# Patient Record
Sex: FEMALE | Race: BLACK OR AFRICAN AMERICAN | Employment: OTHER | URBAN - METROPOLITAN AREA
[De-identification: names, ages, dates, MRNs, and addresses within clinical notes are randomized per-mention and may not be internally consistent; named-entity substitution may affect disease eponyms.]

---

## 2017-05-22 ENCOUNTER — OFFICE VISIT (OUTPATIENT)
Dept: CARDIOLOGY CLINIC | Age: 52
End: 2017-05-22

## 2017-05-22 VITALS
BODY MASS INDEX: 45.12 KG/M2 | HEIGHT: 61 IN | DIASTOLIC BLOOD PRESSURE: 53 MMHG | SYSTOLIC BLOOD PRESSURE: 92 MMHG | WEIGHT: 239 LBS | HEART RATE: 72 BPM

## 2017-05-22 DIAGNOSIS — I26.99 OTHER PULMONARY EMBOLISM WITHOUT ACUTE COR PULMONALE, UNSPECIFIED CHRONICITY (HCC): ICD-10-CM

## 2017-05-22 DIAGNOSIS — Z95.810 AUTOMATIC IMPLANTABLE CARDIOVERTER-DEFIBRILLATOR IN SITU: ICD-10-CM

## 2017-05-22 DIAGNOSIS — I42.9 CARDIOMYOPATHY, UNSPECIFIED TYPE (HCC): ICD-10-CM

## 2017-05-22 DIAGNOSIS — I50.22 SYSTOLIC CHF, CHRONIC (HCC): Primary | ICD-10-CM

## 2017-05-22 NOTE — PROGRESS NOTES
HISTORY OF PRESENT ILLNESS  Korey Sahu is a 46 y.o. female. HPI Comments: Patient with cmp,chf,icd. On follow up patient denies any chest pains, palpitation or other significant symptoms. She has sob on exertion feels worse at times        CHF   The history is provided by the patient. This is a chronic problem. The problem occurs constantly. The problem has not changed since onset. Associated symptoms include shortness of breath. Pertinent negatives include no chest pain. Cardiomyopathy   The history is provided by the patient. This is a chronic problem. The problem occurs constantly. The problem has not changed since onset. Associated symptoms include shortness of breath. Pertinent negatives include no chest pain. Nothing aggravates the symptoms. Review of Systems   Constitutional: Negative for chills and fever. HENT: Negative for nosebleeds. Eyes: Negative for blurred vision and double vision. Respiratory: Positive for shortness of breath. Negative for cough, hemoptysis, sputum production and wheezing. Cardiovascular: Negative for chest pain, palpitations, orthopnea, claudication, leg swelling and PND. Gastrointestinal: Negative for heartburn, nausea and vomiting. Musculoskeletal: Negative for myalgias. Skin: Negative for rash. Neurological: Negative for dizziness and weakness. Endo/Heme/Allergies: Does not bruise/bleed easily.      Family History   Problem Relation Age of Onset    Heart Disease Mother     Heart Disease Father        Past Medical History:   Diagnosis Date    Automatic implantable cardiac defibrillator in situ     BIV ICD 5/10 PLAN    Chronic systolic heart failure (HCC)     SEVERE CMP WITH EF 15%    Diabetes ([address]     DVT (deep venous thrombosis) ([address] 4/2/2015    has ivc filter     Hypercholesterolemia     Non-ischemic cardiomyopathy ([address]     Pulmonary emboli ([address] 3/6/2014    ON XARELTO STABLE     Sleep apnea     uses CPAP       Past Surgical History: Procedure Laterality Date    COLONOSCOPY N/A 6/9/2016    COLONOSCOPY, SCREENING w/polypectomy performed by William David MD at [address] [place] [address] HX APPENDECTOMY      HX GASTRIC BYPASS      did not state     HX GYN      hysterectomy    HX OTHER SURGICAL      ivc filter    HX PACEMAKER  2010    PACEMAKER/DEFIBULATOR: XT CRT-D DEFIBRILATOR IMPLANTED. SEE CARD FOR SERIAL NUMBERS AND MODEL #S AND DATES IMPLANTED       Social History   Substance Use Topics    Smoking status: Former Smoker    Smokeless tobacco: Former User     Quit date: 6/8/1985    Alcohol use No       Allergies   Allergen Reactions    Latex Rash    Ace Inhibitors Swelling    Lisinopril Swelling    Tape [Adhesive] Rash       Current Outpatient Prescriptions   Medication Sig    cetirizine (ZYRTEC) 10 mg tablet Take  by mouth.  benzonatate (TESSALON) 100 mg capsule Take 100 mg by mouth three (3) times daily as needed for Cough.  albuterol (PROVENTIL HFA, VENTOLIN HFA, PROAIR HFA) 90 mcg/actuation inhaler Take 2 Puffs by inhalation as needed for Wheezing.  ergocalciferol (ERGOCALCIFEROL) 50,000 unit capsule Take 50,000 Units by mouth every seven (7) days.  buPROPion SR (WELLBUTRIN SR) 150 mg SR tablet Take 150 mg by mouth daily.  allopurinol (ZYLOPRIM) 100 mg tablet Take  by mouth as needed.  triamcinolone acetonide (KENALOG) 0.1 % topical cream Apply  to affected area two (2) times a day. use thin layer    aspirin delayed-release 81 mg tablet Take  by mouth daily.  sitaGLIPtin (JANUVIA) 50 mg tablet Take 50 mg by mouth daily.  Alpha Lipoic Acid 600 mg cap Take 200 mg by mouth daily.  carvedilol (COREG) 12.5 mg tablet Take  by mouth. 1/2 morning, 1 tablet night    digoxin (LANOXIN) 0.125 mg tablet Take  by mouth daily.  b complex vitamins (B COMPLEX 1) tablet Take 1 Tab by mouth daily.  multivitamin (ONE A DAY) tablet Take 1 Tab by mouth daily.     ferrous sulfate 325 mg (65 mg iron) tablet Take  by mouth Daily (before breakfast).  simvastatin (ZOCOR) 5 mg tablet Take  by mouth nightly.  furosemide (LASIX) 40 mg tablet Take  by mouth daily. Pt alternates 40 mg one day, then 80 mg next day    fluticasone (FLONASE) 50 mcg/actuation nasal spray as needed.  spironolactone (ALDACTONE) 25 mg tablet Take  by mouth daily.  NYSTATIN/TRIAMCIN (MYCOLOG II EX) by Apply Externally route.  calcium citrate-vitamin d3 (CITRACAL+D) 315-200 mg-unit Tab Take 2 Tabs by mouth daily (with breakfast). Calcium citrate po 1000 mg by mouth twice daily    lactic acid (LACTINOL) 10 % lotion Apply 12 % to affected area as needed. No current facility-administered medications for this visit. Visit Vitals    BP 92/53    Pulse 72    Ht 5' 1\" (1.549 m)    Wt 108.4 kg (239 lb)    BMI 45.16 kg/m2         Physical Exam   Constitutional: She is oriented to person, place, and time. She appears well-developed and well-nourished. HENT:   Head: Normocephalic and atraumatic. Eyes: Conjunctivae are normal.   Neck: Neck supple. No JVD present. No tracheal deviation present. No thyromegaly present. Cardiovascular: Normal rate and regular rhythm. PMI is not displaced. Exam reveals no gallop and no decreased pulses. No murmur heard. Early systolic murmur is present  at the upper right sternal border  Pulmonary/Chest: No respiratory distress. She has no wheezes. She has no rales. She exhibits no tenderness. Abdominal: Soft. There is no tenderness. Musculoskeletal: She exhibits no edema. Neurological: She is alert and oriented to person, place, and time. Skin: Skin is warm. Psychiatric: She has a normal mood and affect. Ms. Anny Montgomery has a reminder for a \"due or due soon\" health maintenance. I have asked that she contact her primary care provider for follow-up on this health maintenance.     CARDIOLOGY STUDIES 1/22/2013 12/1/2011 1/1/2011 12/1/2010 1/1/2010   Cardiac Cath Result - - - - EF 15%, mild MR;NL Coronary   Echocardiogram - Complete Result ef 15-20%,dil lv.mild mr,tr Enlarged LV with 15% LVEF EF 25% - -   PFT's with DLCO Result - - - See report -   ECHO:12/2013  MODERATE LEFT VENTRICULAR DILATATION. DECREASED LEFT VENTRICULAR EJECTION FRACTION. GLOBAL HYPOKINESIS. AKINESIS OF THE ANTEROSEPTAL AND APICAL SEGMENTS. EJECTION FRACTION OF 10-15 %. IMPAIRED RELAXATION FILLING PATTERN FOR AGE. MILD LEFT ATRIAL DILATATION. ESTIMATED PULMONARY ARTERY PRESSURE IS 26 MMHG. SUMMARY:echo:4/2015  Left ventricle: The ventricle was severely dilated. Systolic function was  severely reduced. Ejection fraction was estimated to be 10 %. There was  severe diffuse hypokinesis. Doppler parameters were consistent with  restrictive physiology, indicative of decreased left ventricular diastolic  compliance and/or increased left atrial pressure. COMPARISONS:  Comparison was made with the previous study of 13-Apr-2015. LV size  (LVEDD) has increased from 65 mm to 71 mm. LV thickness has not changed. LV overall function has not changed. Mitral regurgitation has worsened. Pulmonary artery pressure has not changed. I Have personally reviewed recent relevant labs available and discussed with patient  4/2016-cbc,bmp,lipids  Assessment       ICD-10-CM ICD-9-CM    1. Systolic CHF, chronic (HCC) I50.22 428.22      428.0     stable   2. Cardiomyopathy, unspecified type I42.9 425.4     stable   3. Automatic implantable cardioverter-defibrillator in situ Z95.810 V45.02     normal function   4.  Other pulmonary embolism without acute cor pulmonale, unspecified chronicity (HCC) I26.99 415.19     completed treatment   Discussed regarding cardiac transplant-followed  in transplant clinic  Unable to tolerate hydralazine/isordil due to low bp    Medications Discontinued During This Encounter   Medication Reason    hydrALAZINE (APRESOLINE) 25 mg tablet Discontinued by Another Clinician    isosorbide dinitrate (ISORDIL) 5 mg tablet Discontinued by Another Clinician       No orders of the defined types were placed in this encounter. Follow-up Disposition:  Return in about 6 months (around 11/22/2017).

## 2017-05-22 NOTE — MR AVS SNAPSHOT
Visit Information Date & Time Provider Department Dept. Phone Encounter #  
 5/22/2017 12:30 PM Patti Sesay MD [facility] [id number] Follow-up Instructions Return in about 6 months (around 11/22/2017). Your Appointments 5/22/2017 12:30 PM  
Follow Up with Patti Sesay MD  
[facility] ([facility]) Appt Note: 6 month follow up; 6 month follow up  
 [address] [facility] [address]  
  
   
 [address] [id number] [address] [address] 7/19/2017  8:00 AM  
[facility] ([facility]) Appt Note: Carelink Transmission/Newell/Wireless [address] [facility] [address]  
  
   
 [address] [facility] [address]  
  
    
 10/27/2017 11:00 AM  
PROCEDURE with Patti Sesay MD  
[facility] ([facility]) Appt Note: 1 year in office pacer check [address] [facility] [address]  
  
   
 [address] [id number] [address] [address] Upcoming Health Maintenance Date Due Hepatitis C Screening 1965 Pneumococcal 19-64 Medium Risk (1 of 1 - PPSV23) 1/19/1984 DTaP/Tdap/Td series (1 - Tdap) 1/19/1986 PAP AKA CERVICAL CYTOLOGY 1/19/1986 FOBT Q 1 YEAR AGE 50-75 1/19/2015 INFLUENZA AGE 9 TO ADULT 8/1/2017 BREAST CANCER SCRN MAMMOGRAM 8/15/2018 Allergies as of 5/22/2017  Review Complete On: 5/22/2017 By: Patti Sesay MD  
  
 Severity Noted Reaction Type Reactions Latex    Rash Ace Inhibitors    Swelling Lisinopril    Swelling Tape [Adhesive]  07/20/2015    Rash Current Immunizations  Never Reviewed No immunizations on file. Not reviewed this visit You Were Diagnosed With   
  
 Codes Comments Systolic CHF, chronic (HCC)    -  Primary ICD-10-CM: K23.16 ICD-9-CM: 428.22, 428.0 stable Cardiomyopathy, unspecified type     ICD-10-CM: I42.9 ICD-9-CM: 425.4 stable Automatic implantable cardioverter-defibrillator in situ     ICD-10-CM: Z95.810 ICD-9-CM: V45.02 normal function Other pulmonary embolism without acute cor pulmonale, unspecified chronicity (HCC)     ICD-10-CM: I26.99 
ICD-9-CM: 415.19 completed treatment Vitals BP Pulse Height(growth percentile) Weight(growth percentile) BMI OB Status 92/53 72 5' 1\" (1.549 m) 239 lb (108.4 kg) 45.16 kg/m2 Hysterectomy Smoking Status Former Smoker Vitals History BMI and BSA Data Body Mass Index Body Surface Area  
 45.16 kg/m 2 2.16 m 2 Preferred Pharmacy Pharmacy Name Phone [facility] [address], [address] [facility] Your Updated Medication List  
  
   
This list is accurate as of: 5/22/17 12:22 PM.  Always use your most recent med list.  
  
  
  
  
 albuterol 90 mcg/actuation inhaler Commonly known as:  PROVENTIL HFA, VENTOLIN HFA, PROAIR HFA Take 2 Puffs by inhalation as needed for Wheezing. allopurinol 100 mg tablet Commonly known as:  Joellyn Zack Take  by mouth as needed. Alpha Lipoic Acid 600 mg Cap Take 200 mg by mouth daily. aspirin delayed-release 81 mg tablet Take  by mouth daily. B COMPLEX 1 tablet Generic drug:  b complex vitamins Take 1 Tab by mouth daily. benzonatate 100 mg capsule Commonly known as:  TESSALON Take 100 mg by mouth three (3) times daily as needed for Cough. buPROPion  mg SR tablet Commonly known as:  Amada Puller Take 150 mg by mouth daily. calcium citrate-vitamin d3 315-200 mg-unit Tab Commonly known as:  CITRACAL+D Take 2 Tabs by mouth daily (with breakfast). Calcium citrate po 1000 mg by mouth twice daily  
  
 cetirizine 10 mg tablet Commonly known as:  ZYRTEC Take  by mouth. COREG 12.5 mg tablet Generic drug:  carvedilol Take  by mouth. 1/2 morning, 1 tablet night  
  
 digoxin 0.125 mg tablet Commonly known as:  LANOXIN Take  by mouth daily. ergocalciferol 50,000 unit capsule Commonly known as:  ERGOCALCIFEROL Take 50,000 Units by mouth every seven (7) days. ferrous sulfate 325 mg (65 mg iron) tablet Take  by mouth Daily (before breakfast). FLONASE 50 mcg/actuation nasal spray Generic drug:  fluticasone  
as needed. JANUVIA 50 mg tablet Generic drug:  SITagliptin Take 50 mg by mouth daily. lactic acid 10 % lotion Commonly known as:  Miguelito Fanti Apply 12 % to affected area as needed. LASIX 40 mg tablet Generic drug:  furosemide Take  by mouth daily. Pt alternates 40 mg one day, then 80 mg next day  
  
 multivitamin tablet Commonly known as:  ONE A DAY Take 1 Tab by mouth daily. MYCOLOG II EX  
by Apply Externally route. simvastatin 5 mg tablet Commonly known as:  ZOCOR Take  by mouth nightly. spironolactone 25 mg tablet Commonly known as:  ALDACTONE Take  by mouth daily. triamcinolone acetonide 0.1 % topical cream  
Commonly known as:  KENALOG Apply  to affected area two (2) times a day. use thin layer Follow-up Instructions Return in about 6 months (around 11/22/2017). Introducing \[facility]\"" & HEALTH SERVICES! Dear Shayna Johns: Thank you for requesting a Gamelet account. Our records indicate that you already have an active Gamelet account. You can access your account anytime at [email or web address] Did you know that you can access your hospital and ER discharge instructions at any time in Gamelet? You can also review all of your test results from your hospital stay or ER visit. Additional Information If you have questions, please visit the Frequently Asked Questions section of the Gamelet website at [email or web address] Remember, LeadCloudhart is NOT to be used for urgent needs. For medical emergencies, dial 911. Now available from your iPhone and Android! Please provide this summary of care documentation to your next provider. Your primary care clinician is listed as Franky Sutherland. If you have any questions after today's visit, please call [phone].

## 2017-05-22 NOTE — PROGRESS NOTES
1. Have you been to the ER, urgent care clinic since your last visit? Hospitalized since your last visit? Yes Where: [facility]    2. Have you seen or consulted any other health care providers outside of the [address] [place] [address] since your last visit? Include any pap smears or colon screening. Yes Where: Dr Elliot Gonzalez     3. Since your last visit, have you had any of the following symptoms? .           4. Have you had any blood work, X-rays or cardiac testing? Yes Where: [facility]             5.  Where do you normally have your labs drawn? [facility]    6. Do you need any refills today?    No

## 2017-05-22 NOTE — LETTER
Teresa Briseno 1965 5/22/2017 Dear MD Viviane Merida MD Verlie Sack, MD Nicklas Rack, MD 
 
I had the pleasure of evaluating  Ms. Steele in office today. Below are the relevant portions of my assessment and plan of care. ICD-10-CM ICD-9-CM 1. Systolic CHF, chronic (HCC) I50.22 428.22   
  428.0   
 stable 2. Cardiomyopathy, unspecified type I42.9 425.4   
 stable 3. Automatic implantable cardioverter-defibrillator in situ Z95.810 V45.02   
 normal function 4. Other pulmonary embolism without acute cor pulmonale, unspecified chronicity (HCC) I26.99 415.19   
 completed treatment Current Outpatient Prescriptions Medication Sig Dispense Refill  cetirizine (ZYRTEC) 10 mg tablet Take  by mouth.  benzonatate (TESSALON) 100 mg capsule Take 100 mg by mouth three (3) times daily as needed for Cough.  albuterol (PROVENTIL HFA, VENTOLIN HFA, PROAIR HFA) 90 mcg/actuation inhaler Take 2 Puffs by inhalation as needed for Wheezing.  ergocalciferol (ERGOCALCIFEROL) 50,000 unit capsule Take 50,000 Units by mouth every seven (7) days.  buPROPion SR (WELLBUTRIN SR) 150 mg SR tablet Take 150 mg by mouth daily.  allopurinol (ZYLOPRIM) 100 mg tablet Take  by mouth as needed.  triamcinolone acetonide (KENALOG) 0.1 % topical cream Apply  to affected area two (2) times a day. use thin layer  aspirin delayed-release 81 mg tablet Take  by mouth daily.  sitaGLIPtin (JANUVIA) 50 mg tablet Take 50 mg by mouth daily.  Alpha Lipoic Acid 600 mg cap Take 200 mg by mouth daily.  carvedilol (COREG) 12.5 mg tablet Take  by mouth. 1/2 morning, 1 tablet night  digoxin (LANOXIN) 0.125 mg tablet Take  by mouth daily.  b complex vitamins (B COMPLEX 1) tablet Take 1 Tab by mouth daily.  multivitamin (ONE A DAY) tablet Take 1 Tab by mouth daily.     
 ferrous sulfate 325 mg (65 mg iron) tablet Take  by mouth Daily (before breakfast).  simvastatin (ZOCOR) 5 mg tablet Take  by mouth nightly.  furosemide (LASIX) 40 mg tablet Take  by mouth daily. Pt alternates 40 mg one day, then 80 mg next day  fluticasone (FLONASE) 50 mcg/actuation nasal spray as needed.  spironolactone (ALDACTONE) 25 mg tablet Take  by mouth daily.  NYSTATIN/TRIAMCIN (MYCOLOG II EX) by Apply Externally route.  calcium citrate-vitamin d3 (CITRACAL+D) 315-200 mg-unit Tab Take 2 Tabs by mouth daily (with breakfast). Calcium citrate po 1000 mg by mouth twice daily  lactic acid (LACTINOL) 10 % lotion Apply 12 % to affected area as needed. No orders of the defined types were placed in this encounter. If you have questions, please do not hesitate to call me. I look forward to following Ms. Steele along with you. Sincerely, Xochilt Chinchilla MD

## 2017-10-27 ENCOUNTER — CLINICAL SUPPORT (OUTPATIENT)
Dept: CARDIOLOGY CLINIC | Age: 52
End: 2017-10-27

## 2017-10-27 VITALS
HEIGHT: 61 IN | WEIGHT: 237 LBS | SYSTOLIC BLOOD PRESSURE: 106 MMHG | DIASTOLIC BLOOD PRESSURE: 55 MMHG | BODY MASS INDEX: 44.75 KG/M2 | HEART RATE: 68 BPM

## 2017-10-27 DIAGNOSIS — Z95.810 AUTOMATIC IMPLANTABLE CARDIOVERTER-DEFIBRILLATOR IN SITU: ICD-10-CM

## 2017-10-27 DIAGNOSIS — I26.99 OTHER PULMONARY EMBOLISM WITHOUT ACUTE COR PULMONALE, UNSPECIFIED CHRONICITY (HCC): ICD-10-CM

## 2017-10-27 DIAGNOSIS — I47.20 V TACH: ICD-10-CM

## 2017-10-27 DIAGNOSIS — I50.23 ACUTE ON CHRONIC SYSTOLIC HEART FAILURE (HCC): Primary | ICD-10-CM

## 2017-10-27 DIAGNOSIS — I42.9 CARDIOMYOPATHY, UNSPECIFIED TYPE (HCC): ICD-10-CM

## 2017-10-27 RX ORDER — FUROSEMIDE 40 MG/1
80 TABLET ORAL DAILY
Qty: 120 TAB | Refills: 6 | Status: SHIPPED | OUTPATIENT
Start: 2017-10-27 | End: 2017-12-05

## 2017-10-27 NOTE — PROGRESS NOTES
1. Have you been to the ER, urgent care clinic since your last visit? Hospitalized since your last visit? No    2. Have you seen or consulted any other health care providers outside of the [address] [place] [address] since your last visit? Include any pap smears or colon screening. Yes Where: Nephrology     3. Since your last visit, have you had any of the following symptoms? shortness of breath. 4.  Have you had any blood work, X-rays or cardiac testing? Yes Where: Dr Candi Weiner for visit: labs              5.  Where do you normally have your labs drawn? [facility]    6. Do you need any refills today?    No

## 2017-10-27 NOTE — LETTER
Maria Guadalupe Hyde 1965 
 
10/27/2017 Dear MD Jefry Zamudio MD Bevely Case, MD Fletcher Koller, MD 
 
I had the pleasure of evaluating  Ms. Steele in office today. Below are the relevant portions of my assessment and plan of care. ICD-10-CM ICD-9-CM 1. Acute on chronic systolic heart failure (HCC) I50.23 428.23 2D ECHO COMPLETE ADULT (TTE) IMPLANTABLE CARDIOVASULAR KARL SYST  
   SINGLE,DUAL,OR MULTIPLE LEAD IMPLANT CARD DEFIB SYST  
 increase sob and fluid overload 
lasix increased 2. Cardiomyopathy, unspecified type ([facility] [address] I42.9 425.4   
 stable 3. Automatic implantable cardioverter-defibrillator in situ Z95.810 V45.02 IMPLANTABLE CARDIOVASULAR KARL SYST  
   SINGLE,DUAL,OR MULTIPLE LEAD IMPLANT CARD DEFIB SYST  
 normal function 4. Other pulmonary embolism without acute cor pulmonale, unspecified chronicity (HCC) I26.99 415.19   
 completed treatment 5. V tach ([facility] [address] I47.2 427.1   
 stable 
noted on icd in past  
 
 
Current Outpatient Prescriptions Medication Sig Dispense Refill  furosemide (LASIX) 40 mg tablet Take 2 Tabs by mouth daily. 120 Tab 6  cetirizine (ZYRTEC) 10 mg tablet Take  by mouth.  benzonatate (TESSALON) 100 mg capsule Take 100 mg by mouth three (3) times daily as needed for Cough.  albuterol (PROVENTIL HFA, VENTOLIN HFA, PROAIR HFA) 90 mcg/actuation inhaler Take 2 Puffs by inhalation as needed for Wheezing.  ergocalciferol (ERGOCALCIFEROL) 50,000 unit capsule Take 50,000 Units by mouth every seven (7) days.  buPROPion SR (WELLBUTRIN SR) 150 mg SR tablet Take 150 mg by mouth daily.  allopurinol (ZYLOPRIM) 100 mg tablet Take  by mouth as needed.  triamcinolone acetonide (KENALOG) 0.1 % topical cream Apply  to affected area two (2) times a day. use thin layer  aspirin delayed-release 81 mg tablet Take  by mouth daily.  sitaGLIPtin (JANUVIA) 50 mg tablet Take 50 mg by mouth daily.  Alpha Lipoic Acid 600 mg cap Take 200 mg by mouth daily.  carvedilol (COREG) 12.5 mg tablet Take  by mouth. 1/2 morning, 1 tablet night  digoxin (LANOXIN) 0.125 mg tablet Take  by mouth daily.  b complex vitamins (B COMPLEX 1) tablet Take 1 Tab by mouth daily.  multivitamin (ONE A DAY) tablet Take 1 Tab by mouth daily.  ferrous sulfate 325 mg (65 mg iron) tablet Take  by mouth Daily (before breakfast).  simvastatin (ZOCOR) 5 mg tablet Take  by mouth nightly.  fluticasone (FLONASE) 50 mcg/actuation nasal spray as needed.  NYSTATIN/TRIAMCIN (MYCOLOG II EX) by Apply Externally route.  calcium citrate-vitamin d3 (CITRACAL+D) 315-200 mg-unit Tab Take 2 Tabs by mouth daily (with breakfast). Calcium citrate po 1000 mg by mouth twice daily  lactic acid (LACTINOL) 10 % lotion Apply 12 % to affected area as needed. Orders Placed This Encounter [id number]  2D ECHO COMPLETE ADULT (TTE) Standing Status:   Future Standing Expiration Date:   4/25/2018 Order Specific Question:   Reason for Exam: Answer:   see diagnosis  SINGLE,DUAL,OR MULTIPLE LEAD IMPLANT CARD DEFIB SYST Order Specific Question:   Reason for Exam: Answer:   icd  furosemide (LASIX) 40 mg tablet Sig: Take 2 Tabs by mouth daily. Dispense:  120 Tab Refill:  6 If you have questions, please do not hesitate to call me. I look forward to following Ms. Steele along with you. Sincerely, Agustin lOivas MD

## 2017-10-27 NOTE — MR AVS SNAPSHOT
Visit Information Date & Time Provider Department Dept. Phone Encounter #  
 10/27/2017 11:00 AM Jordana Robb MD [facility] [phone] [id number] Follow-up Instructions Return in about 2 months (around 12/27/2017). Your Appointments 11/30/2017  1:30 PM  
PROCEDURE with [place] ECHO [facility] ([facility]) Appt Note: echo/granda [address] [facility] [address]  
  
   
 [address] [id number] [address] [address]  
  
    
 12/5/2017 10:15 AM  
Follow Up with Jordana Robb MD  
[facility] ([facility]) Appt Note: 2 month [address] [facility] [address]  
  
   
 [address] [id number] [address] [address]  
  
    
 1/31/2018  9:00 AM  
CARELINK with [facility] PACER [facility] ([facility]) Appt Note: Carelink Transmission/Granda/Wireless [address] [facility] [address]  
  
   
 [address] [facility] [address]  
  
    
 5/2/2018  8:15 AM  
CARELINK with [facility] PACER [facility] ([facility]) Appt Note: Carelink Transmission/Granda/Wireless [address] [facility] [address]  
[phone]  
  
    
 8/8/2018  8:15 AM  
CARELINK with [facility] PACER [facility] ([facility]) Appt Note: Carelink Transmission/Granda/Wireless [address] [facility] [address]  
[phone]  
  
    
 12/7/2018 10:30 AM  
PROCEDURE with Jordana Robb MD  
[facility] ([facility]) Appt Note: 1 year in office pacer check/Medtronic [address] [facility] [address]  
  
   
 [address] [address] [place] [address] Upcoming Health Maintenance Date Due Hepatitis C Screening 1965 Pneumococcal 19-64 Medium Risk (1 of 1 - PPSV23) 1/19/1984 DTaP/Tdap/Td series (1 - Tdap) 1/19/1986 PAP AKA CERVICAL CYTOLOGY 1/19/1986 FOBT Q 1 YEAR AGE 50-75 1/19/2015 INFLUENZA AGE 9 TO ADULT 8/1/2017 BREAST CANCER SCRN MAMMOGRAM 8/16/2019 Allergies as of 10/27/2017  Review Complete On: 10/27/2017 By: Agustin Olivas MD  
  
 Severity Noted Reaction Type Reactions Latex    Rash Ace Inhibitors    Swelling Lisinopril    Swelling Tape [Adhesive]  07/20/2015    Rash Current Immunizations  Never Reviewed No immunizations on file. Not reviewed this visit You Were Diagnosed With   
  
 Codes Comments Acute on chronic systolic heart failure (HCC)    -  Primary ICD-10-CM: M99.68 ICD-9-CM: 428.23 increase sob and fluid overload 
lasix increased Cardiomyopathy, unspecified type ([facility]     ICD-10-CM: I42.9 ICD-9-CM: 425.4 stable Automatic implantable cardioverter-defibrillator in situ     ICD-10-CM: Z95.810 ICD-9-CM: V45.02 normal function Other pulmonary embolism without acute cor pulmonale, unspecified chronicity (HCC)     ICD-10-CM: I26.99 
ICD-9-CM: 415.19 completed treatment V tach ([facility]     ICD-10-CM: E76.9 ICD-9-CM: 427.1 stable 
noted on icd in past  
  
Vitals BP Pulse Height(growth percentile) Weight(growth percentile) BMI OB Status 106/55 68 5' 1\" (1.549 m) 237 lb (107.5 kg) 44.78 kg/m2 Hysterectomy Smoking Status Former Smoker Vitals History BMI and BSA Data Body Mass Index Body Surface Area 44.78 kg/m 2 2.15 m 2 Preferred Pharmacy Pharmacy Name Phone [facility] [address], [address] [facility] Your Updated Medication List  
  
   
This list is accurate as of: 10/27/17 11:31 AM.  Always use your most recent med list.  
  
  
  
  
 albuterol 90 mcg/actuation inhaler Commonly known as:  PROVENTIL HFA, VENTOLIN HFA, PROAIR HFA Take 2 Puffs by inhalation as needed for Wheezing. allopurinol 100 mg tablet Commonly known as:  Weir Reddish Take  by mouth as needed. Alpha Lipoic Acid 600 mg Cap Take 200 mg by mouth daily. aspirin delayed-release 81 mg tablet Take  by mouth daily. B COMPLEX 1 tablet Generic drug:  b complex vitamins Take 1 Tab by mouth daily. benzonatate 100 mg capsule Commonly known as:  TESSALON Take 100 mg by mouth three (3) times daily as needed for Cough. buPROPion  mg SR tablet Commonly known as:  Barbette [employer] Take 150 mg by mouth daily. calcium citrate-vitamin d3 315-200 mg-unit Tab Commonly known as:  CITRACAL+D Take 2 Tabs by mouth daily (with breakfast). Calcium citrate po 1000 mg by mouth twice daily  
  
 cetirizine 10 mg tablet Commonly known as:  ZYRTEC Take  by mouth. COREG 12.5 mg tablet Generic drug:  carvedilol Take  by mouth. 1/2 morning, 1 tablet night  
  
 digoxin 0.125 mg tablet Commonly known as:  LANOXIN Take  by mouth daily. ergocalciferol 50,000 unit capsule Commonly known as:  ERGOCALCIFEROL Take 50,000 Units by mouth every seven (7) days. ferrous sulfate 325 mg (65 mg iron) tablet Take  by mouth Daily (before breakfast). FLONASE 50 mcg/actuation nasal spray Generic drug:  fluticasone  
as needed. furosemide 40 mg tablet Commonly known as:  LASIX Take 2 Tabs by mouth daily. JANUVIA 50 mg tablet Generic drug:  SITagliptin Take 50 mg by mouth daily. lactic acid 10 % lotion Commonly known as:  Nicholas Kudo Apply 12 % to affected area as needed. multivitamin tablet Commonly known as:  ONE A DAY Take 1 Tab by mouth daily. MYCOLOG II EX  
by Apply Externally route. simvastatin 5 mg tablet Commonly known as:  ZOCOR Take  by mouth nightly. triamcinolone acetonide 0.1 % topical cream  
Commonly known as:  KENALOG Apply  to affected area two (2) times a day. use thin layer Prescriptions Sent to Pharmacy Refills  
 furosemide (LASIX) 40 mg tablet 6 Sig: Take 2 Tabs by mouth daily. Class: Normal  
 Pharmacy: [facility] [address] Ph #: [phone] Route: Oral  
  
We Performed the Following IMPLANTABLE CARDIOVASULAR KARL SYST [id number] CPT(R)] [facility] MULTIPLE LEAD IMPLANT CARD DEFIB SYST [17703 CPT(R)] Follow-up Instructions Return in about 2 months (around 12/27/2017). To-Do List   
 10/30/2017 Cardiac Services:  2D ECHO COMPLETE ADULT (TTE) Introducing [facility]! Dear Caitlin Corrales: Thank you for requesting a ShopCity.com account. Our records indicate that you already have an active ShopCity.com account. You can access your account anytime at [email or web address] Did you know that you can access your hospital and ER discharge instructions at any time in ShopCity.com? You can also review all of your test results from your hospital stay or ER visit. Additional Information If you have questions, please visit the Frequently Asked Questions section of the [email or web address] website at [email or web address] Remember, ShopCity.com is NOT to be used for urgent needs. For medical emergencies, dial 911. Now available from your iPhone and Android! Please provide this summary of care documentation to your next provider. Your primary care clinician is listed as Isrrael West. If you have any questions after today's visit, please call [phone].

## 2017-10-27 NOTE — PROGRESS NOTES
HISTORY OF PRESENT ILLNESS  Keiry Purdy is a 46 y.o. female. HPI Comments: Patient with cmp,chf,icd. On follow up patient denies any chest pains, palpitation or other significant symptoms. She has sob on exertion feels worse at times        Cardiomyopathy   The history is provided by the patient. This is a chronic problem. The problem occurs constantly. The problem has not changed since onset. Associated symptoms include shortness of breath. Pertinent negatives include no chest pain. Nothing aggravates the symptoms. CHF   The history is provided by the patient. This is a chronic problem. The problem occurs constantly. The problem has not changed since onset. Associated symptoms include shortness of breath. Pertinent negatives include no chest pain. Review of Systems   Constitutional: Negative for chills and fever. HENT: Negative for nosebleeds. Eyes: Negative for blurred vision and double vision. Respiratory: Positive for shortness of breath. Negative for cough, hemoptysis, sputum production and wheezing. Cardiovascular: Negative for chest pain, palpitations, orthopnea, claudication, leg swelling and PND. Gastrointestinal: Negative for heartburn, nausea and vomiting. Musculoskeletal: Negative for myalgias. Skin: Negative for rash. Neurological: Negative for dizziness and weakness. Endo/Heme/Allergies: Does not bruise/bleed easily.      Family History   Problem Relation Age of Onset    Heart Disease Mother     Heart Disease Father        Past Medical History:   Diagnosis Date    Automatic implantable cardiac defibrillator in situ     BIV ICD 5/10 PLAN    Chronic systolic heart failure (HCC)     SEVERE CMP WITH EF 15%    Diabetes ([address]     DVT (deep venous thrombosis) ([address] 4/2/2015    has ivc filter     Hypercholesterolemia     Non-ischemic cardiomyopathy ([address]     Pulmonary emboli ([address] 3/6/2014    ON XARELTO STABLE     Sleep apnea     uses CPAP       Past Surgical History: Procedure Laterality Date    COLONOSCOPY N/A 6/9/2016    COLONOSCOPY, SCREENING w/polypectomy performed by Oskar Hooper MD at [address] [place] [address] HX APPENDECTOMY      HX GASTRIC BYPASS      did not state     HX GYN      hysterectomy    HX OTHER SURGICAL      ivc filter    HX PACEMAKER  2010    PACEMAKER/DEFIBULATOR: XT CRT-D DEFIBRILATOR IMPLANTED. SEE CARD FOR SERIAL NUMBERS AND MODEL #S AND DATES IMPLANTED       Social History   Substance Use Topics    Smoking status: Former Smoker    Smokeless tobacco: Former User     Quit date: 6/8/1985    Alcohol use No       Allergies   Allergen Reactions    Latex Rash    Ace Inhibitors Swelling    Lisinopril Swelling    Tape [Adhesive] Rash       Current Outpatient Prescriptions   Medication Sig    furosemide (LASIX) 40 mg tablet Take 2 Tabs by mouth daily.  cetirizine (ZYRTEC) 10 mg tablet Take  by mouth.  benzonatate (TESSALON) 100 mg capsule Take 100 mg by mouth three (3) times daily as needed for Cough.  albuterol (PROVENTIL HFA, VENTOLIN HFA, PROAIR HFA) 90 mcg/actuation inhaler Take 2 Puffs by inhalation as needed for Wheezing.  ergocalciferol (ERGOCALCIFEROL) 50,000 unit capsule Take 50,000 Units by mouth every seven (7) days.  buPROPion SR (WELLBUTRIN SR) 150 mg SR tablet Take 150 mg by mouth daily.  allopurinol (ZYLOPRIM) 100 mg tablet Take  by mouth as needed.  triamcinolone acetonide (KENALOG) 0.1 % topical cream Apply  to affected area two (2) times a day. use thin layer    aspirin delayed-release 81 mg tablet Take  by mouth daily.  sitaGLIPtin (JANUVIA) 50 mg tablet Take 50 mg by mouth daily.  Alpha Lipoic Acid 600 mg cap Take 200 mg by mouth daily.  carvedilol (COREG) 12.5 mg tablet Take  by mouth. 1/2 morning, 1 tablet night    digoxin (LANOXIN) 0.125 mg tablet Take  by mouth daily.  b complex vitamins (B COMPLEX 1) tablet Take 1 Tab by mouth daily.  multivitamin (ONE A DAY) tablet Take 1 Tab by mouth daily.  ferrous sulfate 325 mg (65 mg iron) tablet Take  by mouth Daily (before breakfast).  simvastatin (ZOCOR) 5 mg tablet Take  by mouth nightly.  fluticasone (FLONASE) 50 mcg/actuation nasal spray as needed.  NYSTATIN/TRIAMCIN (MYCOLOG II EX) by Apply Externally route.  calcium citrate-vitamin d3 (CITRACAL+D) 315-200 mg-unit Tab Take 2 Tabs by mouth daily (with breakfast). Calcium citrate po 1000 mg by mouth twice daily    lactic acid (LACTINOL) 10 % lotion Apply 12 % to affected area as needed. No current facility-administered medications for this visit. Visit Vitals    /55    Pulse 68    Ht 5' 1\" (1.549 m)    Wt 107.5 kg (237 lb)    BMI 44.78 kg/m2         Physical Exam   Constitutional: She is oriented to person, place, and time. She appears well-developed and well-nourished. HENT:   Head: Normocephalic and atraumatic. Eyes: Conjunctivae are normal.   Neck: Neck supple. No JVD present. No tracheal deviation present. No thyromegaly present. Cardiovascular: Normal rate and regular rhythm. PMI is not displaced. Exam reveals no gallop and no decreased pulses. No murmur heard. Early systolic murmur is present  at the upper right sternal border  Pulmonary/Chest: No respiratory distress. She has no wheezes. She has no rales. She exhibits no tenderness. Abdominal: Soft. There is no tenderness. Musculoskeletal: She exhibits no edema. Neurological: She is alert and oriented to person, place, and time. Skin: Skin is warm. Psychiatric: She has a normal mood and affect. Ms. Mena Askew has a reminder for a \"due or due soon\" health maintenance. I have asked that she contact her primary care provider for follow-up on this health maintenance.     CARDIOLOGY STUDIES 1/22/2013 12/1/2011 1/1/2011 12/1/2010 1/1/2010   Cardiac Cath Result - - - - EF 15%, mild MR;NL Coronary   Echocardiogram - Complete Result ef 15-20%,dil lv.mild mr,tr Enlarged LV with 15% LVEF EF 25% - -   PFT's with DLCO Result - - - See report -   Some recent data might be hidden   ECHO:12/2013  MODERATE LEFT VENTRICULAR DILATATION. DECREASED LEFT VENTRICULAR EJECTION FRACTION. GLOBAL HYPOKINESIS. AKINESIS OF THE ANTEROSEPTAL AND APICAL SEGMENTS. EJECTION FRACTION OF 10-15 %. IMPAIRED RELAXATION FILLING PATTERN FOR AGE. MILD LEFT ATRIAL DILATATION. ESTIMATED PULMONARY ARTERY PRESSURE IS 26 MMHG. SUMMARY:echo:4/2015  Left ventricle: The ventricle was severely dilated. Systolic function was  severely reduced. Ejection fraction was estimated to be 10 %. There was  severe diffuse hypokinesis. Doppler parameters were consistent with  restrictive physiology, indicative of decreased left ventricular diastolic  compliance and/or increased left atrial pressure. COMPARISONS:  Comparison was made with the previous study of 13-Apr-2015. LV size  (LVEDD) has increased from 65 mm to 71 mm. LV thickness has not changed. LV overall function has not changed. Mitral regurgitation has worsened. Pulmonary artery pressure has not changed. I Have personally reviewed recent relevant labs available and discussed with patient  4/2016-cbc,bmp,lipids  10/2017  Assessment       ICD-10-CM ICD-9-CM    1. Acute on chronic systolic heart failure (HCC) I50.23 428.23 2D ECHO COMPLETE ADULT (TTE)      IMPLANTABLE CARDIOVASULAR KARL SYST      SINGLE,DUAL,OR MULTIPLE LEAD IMPLANT CARD DEFIB SYST    increase sob and fluid overload  lasix increased   2. Cardiomyopathy, unspecified type ([address] I42.9 425.4     stable   3. Automatic implantable cardioverter-defibrillator in situ Z95.810 V45.02 IMPLANTABLE CARDIOVASULAR KARL SYST      SINGLE,DUAL,OR MULTIPLE LEAD IMPLANT CARD DEFIB SYST    normal function   4. Other pulmonary embolism without acute cor pulmonale, unspecified chronicity (HCC) I26.99 415.19     completed treatment   5.  V tach ([address] I47.2 427.1     stable  noted on icd in past   Discussed regarding cardiac transplant-followed  in transplant clinic  Unable to tolerate hydralazine/isordil due to low bp    Medications Discontinued During This Encounter   Medication Reason    furosemide (LASIX) 40 mg tablet Reorder    spironolactone (ALDACTONE) 25 mg tablet Discontinued by Another Clinician       Orders Placed This Encounter    IMPLANTABLE CARDIOVASULAR KARL SYST    2D ECHO COMPLETE ADULT (TTE)     Standing Status:   Future     Standing Expiration Date:   4/25/2018     Order Specific Question:   Reason for Exam:     Answer:   see diagnosis    SINGLE,DUAL,OR MULTIPLE LEAD IMPLANT CARD DEFIB SYST     Order Specific Question:   Reason for Exam:     Answer:   icd    furosemide (LASIX) 40 mg tablet     Sig: Take 2 Tabs by mouth daily. Dispense:  120 Tab     Refill:  6       Follow-up Disposition:  Return in about 2 months (around 12/27/2017).

## 2017-11-22 DIAGNOSIS — I50.23 ACUTE ON CHRONIC SYSTOLIC HEART FAILURE (HCC): ICD-10-CM

## 2017-12-05 ENCOUNTER — OFFICE VISIT (OUTPATIENT)
Dept: CARDIOLOGY CLINIC | Age: 52
End: 2017-12-05

## 2017-12-05 VITALS
BODY MASS INDEX: 42.56 KG/M2 | SYSTOLIC BLOOD PRESSURE: 85 MMHG | HEIGHT: 61 IN | WEIGHT: 225.4 LBS | DIASTOLIC BLOOD PRESSURE: 58 MMHG | HEART RATE: 91 BPM

## 2017-12-05 DIAGNOSIS — I42.9 CARDIOMYOPATHY, UNSPECIFIED TYPE (HCC): ICD-10-CM

## 2017-12-05 DIAGNOSIS — Z95.810 AUTOMATIC IMPLANTABLE CARDIOVERTER-DEFIBRILLATOR IN SITU: ICD-10-CM

## 2017-12-05 DIAGNOSIS — I47.20 V TACH: ICD-10-CM

## 2017-12-05 DIAGNOSIS — I50.23 ACUTE ON CHRONIC SYSTOLIC HEART FAILURE (HCC): Primary | ICD-10-CM

## 2017-12-05 RX ORDER — POTASSIUM CHLORIDE 1.5 G/1.77G
20 POWDER, FOR SOLUTION ORAL 2 TIMES DAILY
COMMUNITY

## 2017-12-05 RX ORDER — BUMETANIDE 2 MG/1
2 TABLET ORAL DAILY
COMMUNITY

## 2017-12-05 NOTE — MR AVS SNAPSHOT
Visit Information Date & Time Provider Department Dept. Phone Encounter #  
 12/5/2017 10:15 AM Leticia Conte MD [facility] [phone] Follow-up Instructions Return in about 3 months (around 3/5/2018). Your Appointments 1/31/2018  9:00 AM  
CARELINK with [facility] PACER [facility] [place] [facility] Appt Note: Carelink Transmission/Newell/Wireless [address] [facility] [address]  
  
   
 [address] [facility]  
  
    
 5/2/2018  8:15 AM  
CARELINK with [facility] PACER [facility] [place] [facility] Appt Note: Carelink Transmission/Newell/Wireless [address] [facility]  
[phone]  
  
    
 8/8/2018  8:15 AM  
CARELINK with [facility] PACER [facility] [place] [facility] Appt Note: Carelink Transmission/Newell/Wireless [address] [facility]  
[phone]  
  
    
 12/7/2018 10:30 AM  
PROCEDURE with Leticia Conte MD  
[facility] Appt Note: 1 year in office pacer check/Medtronic [address] [facility] [address]  
  
   
 [address] [facility] [address] Upcoming Health Maintenance Date Due Hepatitis C Screening 1965 Pneumococcal 19-64 Medium Risk (1 of 1 - PPSV23) 1/19/1984 DTaP/Tdap/Td series (1 - Tdap) 1/19/1986 PAP AKA CERVICAL CYTOLOGY 1/19/1986 FOBT Q 1 YEAR AGE 50-75 1/19/2015 Influenza Age 5 to Adult 8/1/2017 BREAST CANCER SCRN MAMMOGRAM 8/16/2019 Allergies as of 12/5/2017  Review Complete On: 12/5/2017 By: Leticia Conte MD  
  
 Severity Noted Reaction Type Reactions Latex    Rash Ace Inhibitors    Swelling Lisinopril    Swelling Tape [Adhesive]  07/20/2015    Rash Current Immunizations  Never Reviewed No immunizations on file. Not reviewed this visit You Were Diagnosed With   
  
 Codes Comments Acute on chronic systolic heart failure (HCC)    -  Primary ICD-10-CM: A39.12 ICD-9-CM: 428.23 stable 
ef 5% 
has significant weight loss 
edema improving Cardiomyopathy, unspecified type ([facility]     ICD-10-CM: I42.9 ICD-9-CM: 425.4 ef 5% Automatic implantable cardioverter-defibrillator in situ     ICD-10-CM: Z95.810 ICD-9-CM: V45.02 stable 
normal function V tach ([facility]     ICD-10-CM: X51.7 ICD-9-CM: 427.1 had atp in past  
  
Vitals BP Pulse Height(growth percentile) Weight(growth percentile) BMI OB Status (!) 85/58 91 5' 1\" (1.549 m) 225 lb 6.4 oz (102.2 kg) 42.59 kg/m2 Hysterectomy Smoking Status Former Smoker Vitals History BMI and BSA Data Body Mass Index Body Surface Area 42.59 kg/m 2 2.1 m 2 Preferred Pharmacy Pharmacy Name Phone [facility] [address], [address] [facility] Your Updated Medication List  
  
   
This list is accurate as of: 12/5/17 10:33 AM.  Always use your most recent med list.  
  
  
  
  
 albuterol 90 mcg/actuation inhaler Commonly known as:  PROVENTIL HFA, VENTOLIN HFA, PROAIR HFA Take 2 Puffs by inhalation as needed for Wheezing. allopurinol 100 mg tablet Commonly known as:  Nazario Velazquez Take  by mouth as needed. Alpha Lipoic Acid 600 mg Cap Take 200 mg by mouth daily. aspirin delayed-release 81 mg tablet Take  by mouth daily. B COMPLEX 1 tablet Generic drug:  b complex vitamins Take 1 Tab by mouth daily. benzonatate 100 mg capsule Commonly known as:  TESSALON Take 100 mg by mouth three (3) times daily as needed for Cough. bumetanide 2 mg tablet Commonly known as:  Victor M Hy Take 2 mg by mouth daily. 2 tablets in the morning, 1 tablet in the evening buPROPion  mg SR tablet Commonly known as:  Evelyn White  
 Take 150 mg by mouth daily. calcium citrate-vitamin d3 315-200 mg-unit Tab Commonly known as:  CITRACAL+D Take 2 Tabs by mouth daily (with breakfast). Calcium citrate po 1000 mg by mouth twice daily  
  
 cetirizine 10 mg tablet Commonly known as:  ZYRTEC Take  by mouth. COREG 12.5 mg tablet Generic drug:  carvedilol Take  by mouth. 1/2 morning, 6.25 evenings  
  
 digoxin 0.125 mg tablet Commonly known as:  LANOXIN Take  by mouth daily. ferrous sulfate 325 mg (65 mg iron) tablet Take  by mouth Daily (before breakfast). FLONASE 50 mcg/actuation nasal spray Generic drug:  fluticasone  
as needed. JANUVIA 50 mg tablet Generic drug:  SITagliptin Take 50 mg by mouth daily. lactic acid 10 % lotion Commonly known as:  Bri Florencio Apply 12 % to affected area as needed. multivitamin tablet Commonly known as:  ONE A DAY Take 1 Tab by mouth daily. MYCOLOG II EX  
by Apply Externally route. potassium chloride 20 mEq packet Commonly known as:  KLOR-CON Take 20 mEq by mouth two (2) times a day. simvastatin 5 mg tablet Commonly known as:  ZOCOR Take  by mouth nightly. triamcinolone acetonide 0.1 % topical cream  
Commonly known as:  KENALOG Apply  to affected area two (2) times a day. use thin layer Follow-up Instructions Return in about 3 months (around 3/5/2018). Introducing [facility]! Dear Suma Engel: Thank you for requesting a Webvanta account. Our records indicate that you already have an active Webvanta account. You can access your account anytime at [email or web address] Did you know that you can access your hospital and ER discharge instructions at any time in Webvanta? You can also review all of your test results from your hospital stay or ER visit. Additional Information If you have questions, please visit the Frequently Asked Questions section of the VesselVanguard website at [email or web address] Remember, VesselVanguard is NOT to be used for urgent needs. For medical emergencies, dial 911. Now available from your iPhone and Android! Please provide this summary of care documentation to your next provider. Your primary care clinician is listed as Gilbert Ellis. If you have any questions after today's visit, please call [phone].

## 2017-12-05 NOTE — PROGRESS NOTES
1. Have you been to the ER, urgent care clinic since your last visit? Hospitalized since your last visit? No    2. Have you seen or consulted any other health care providers outside of the [address] [place] [address] since your last visit? Include any pap smears or colon screening. Yes Where: Dr Funes/Cardiologist     3. Since your last visit, have you had any of the following symptoms? .          4. Have you had any blood work, X-rays or cardiac testing? Yes Where: [facility] Reason for visit: Echo             5.  Where do you normally have your labs drawn? [facility]    6. Do you need any refills today?    No

## 2017-12-05 NOTE — PROGRESS NOTES
HISTORY OF PRESENT ILLNESS  Yamileth Weiner is a 46 y.o. female. HPI Comments: Patient with cmp,chf,icd. On follow up patient denies any chest pains, palpitation or other significant symptoms. She has sob on exertion feels better        Cardiomyopathy   The history is provided by the patient. This is a chronic problem. The problem occurs constantly. The problem has not changed since onset. Associated symptoms include shortness of breath. Pertinent negatives include no chest pain. Nothing aggravates the symptoms. CHF   The history is provided by the patient. This is a chronic problem. The problem occurs constantly. The problem has not changed since onset. Associated symptoms include shortness of breath. Pertinent negatives include no chest pain. Review of Systems   Constitutional: Negative for chills and fever. HENT: Negative for nosebleeds. Eyes: Negative for blurred vision and double vision. Respiratory: Positive for shortness of breath. Negative for cough, hemoptysis, sputum production and wheezing. Cardiovascular: Negative for chest pain, palpitations, orthopnea, claudication, leg swelling and PND. Gastrointestinal: Negative for heartburn, nausea and vomiting. Musculoskeletal: Negative for myalgias. Skin: Negative for rash. Neurological: Negative for dizziness and weakness. Endo/Heme/Allergies: Does not bruise/bleed easily.      Family History   Problem Relation Age of Onset    Heart Disease Mother     Heart Disease Father        Past Medical History:   Diagnosis Date    Automatic implantable cardiac defibrillator in situ     BIV ICD 5/10 PLAN    Chronic systolic heart failure (HCC)     SEVERE CMP WITH EF 15%    Diabetes ([address]     DVT (deep venous thrombosis) ([address] 4/2/2015    has ivc filter     Hypercholesterolemia     Non-ischemic cardiomyopathy ([address]     Pulmonary emboli ([address] 3/6/2014    ON XARELTO STABLE     Sleep apnea     uses CPAP       Past Surgical History: Procedure Laterality Date    COLONOSCOPY N/A 6/9/2016    COLONOSCOPY, SCREENING w/polypectomy performed by Tyron Alvarado MD at [address] [place] [address] HX APPENDECTOMY      HX GASTRIC BYPASS      did not state     HX GYN      hysterectomy    HX OTHER SURGICAL      ivc filter    HX PACEMAKER  2010    PACEMAKER/DEFIBULATOR: XT CRT-D DEFIBRILATOR IMPLANTED. SEE CARD FOR SERIAL NUMBERS AND MODEL #S AND DATES IMPLANTED       Social History   Substance Use Topics    Smoking status: Former Smoker    Smokeless tobacco: Former User     Quit date: 6/8/1985    Alcohol use No       Allergies   Allergen Reactions    Latex Rash    Ace Inhibitors Swelling    Lisinopril Swelling    Tape [Adhesive] Rash       Current Outpatient Prescriptions   Medication Sig    bumetanide (BUMEX) 2 mg tablet Take 2 mg by mouth daily. 2 tablets in the morning, 1 tablet in the evening    potassium chloride (KLOR-CON) 20 mEq packet Take 20 mEq by mouth two (2) times a day.  cetirizine (ZYRTEC) 10 mg tablet Take  by mouth.  benzonatate (TESSALON) 100 mg capsule Take 100 mg by mouth three (3) times daily as needed for Cough.  albuterol (PROVENTIL HFA, VENTOLIN HFA, PROAIR HFA) 90 mcg/actuation inhaler Take 2 Puffs by inhalation as needed for Wheezing.  buPROPion SR (WELLBUTRIN SR) 150 mg SR tablet Take 150 mg by mouth daily.  allopurinol (ZYLOPRIM) 100 mg tablet Take  by mouth as needed.  triamcinolone acetonide (KENALOG) 0.1 % topical cream Apply  to affected area two (2) times a day. use thin layer    aspirin delayed-release 81 mg tablet Take  by mouth daily.  sitaGLIPtin (JANUVIA) 50 mg tablet Take 50 mg by mouth daily.  Alpha Lipoic Acid 600 mg cap Take 200 mg by mouth daily.  carvedilol (COREG) 12.5 mg tablet Take  by mouth. 1/2 morning, 6.25 evenings    digoxin (LANOXIN) 0.125 mg tablet Take  by mouth daily.  b complex vitamins (B COMPLEX 1) tablet Take 1 Tab by mouth daily.     multivitamin (ONE A DAY) tablet Take 1 Tab by mouth daily.  ferrous sulfate 325 mg (65 mg iron) tablet Take  by mouth Daily (before breakfast).  simvastatin (ZOCOR) 5 mg tablet Take  by mouth nightly.  fluticasone (FLONASE) 50 mcg/actuation nasal spray as needed.  NYSTATIN/TRIAMCIN (MYCOLOG II EX) by Apply Externally route.  calcium citrate-vitamin d3 (CITRACAL+D) 315-200 mg-unit Tab Take 2 Tabs by mouth daily (with breakfast). Calcium citrate po 1000 mg by mouth twice daily    lactic acid (LACTINOL) 10 % lotion Apply 12 % to affected area as needed. No current facility-administered medications for this visit. Visit Vitals    BP (!) 85/58    Pulse 91    Ht 5' 1\" (1.549 m)    Wt 102.2 kg (225 lb 6.4 oz)    BMI 42.59 kg/m2         Physical Exam   Constitutional: She is oriented to person, place, and time. She appears well-developed and well-nourished. HENT:   Head: Normocephalic and atraumatic. Eyes: Conjunctivae are normal.   Neck: Neck supple. No JVD present. No tracheal deviation present. No thyromegaly present. Cardiovascular: Normal rate and regular rhythm. PMI is not displaced. Exam reveals no gallop and no decreased pulses. No murmur heard. Early systolic murmur is present  at the upper right sternal border  Pulmonary/Chest: No respiratory distress. She has no wheezes. She has no rales. She exhibits no tenderness. Abdominal: Soft. There is no tenderness. Musculoskeletal: She exhibits no edema. Neurological: She is alert and oriented to person, place, and time. Skin: Skin is warm. Psychiatric: She has a normal mood and affect. Ms. Chester Dias has a reminder for a \"due or due soon\" health maintenance. I have asked that she contact her primary care provider for follow-up on this health maintenance.     CARDIOLOGY STUDIES 1/22/2013 12/1/2011 1/1/2011 12/1/2010 1/1/2010   Cardiac Cath Result - - - - EF 15%, mild MR;NL Coronary   Echocardiogram - Complete Result ef 15-20%,dil lv.mild mr,tr Enlarged LV with 15% LVEF EF 25% - -   PFT's with DLCO Result - - - See report -   Some recent data might be hidden   ECHO:12/2013  MODERATE LEFT VENTRICULAR DILATATION. DECREASED LEFT VENTRICULAR EJECTION FRACTION. GLOBAL HYPOKINESIS. AKINESIS OF THE ANTEROSEPTAL AND APICAL SEGMENTS. EJECTION FRACTION OF 10-15 %. IMPAIRED RELAXATION FILLING PATTERN FOR AGE. MILD LEFT ATRIAL DILATATION. ESTIMATED PULMONARY ARTERY PRESSURE IS 26 MMHG. SUMMARY:echo:4/2015  Left ventricle: The ventricle was severely dilated. Systolic function was  severely reduced. Ejection fraction was estimated to be 10 %. There was  severe diffuse hypokinesis. Doppler parameters were consistent with  restrictive physiology, indicative of decreased left ventricular diastolic  compliance and/or increased left atrial pressure. COMPARISONS:  Comparison was made with the previous study of 13-Apr-2015. LV size  (LVEDD) has increased from 65 mm to 71 mm. LV thickness has not changed. LV overall function has not changed. Mitral regurgitation has worsened. Pulmonary artery pressure has not changed. I Have personally reviewed recent relevant labs available and discussed with patient  4/2016-cbc,bmp,lipids  10/2017  11/2017    11/2017-  Echo-  Ef 5%  Pap 63  Assessment       ICD-10-CM ICD-9-CM    1. Acute on chronic systolic heart failure (HCC) I50.23 428.23     stable  ef 5%  has significant weight loss  edema improving   2. Cardiomyopathy, unspecified type ([address] I42.9 425.4     ef 5%   3. Automatic implantable cardioverter-defibrillator in situ Z95.810 V45.02     stable  normal function   4. V tach ([address] I47.2 427.1     had atp in past   Discussed regarding cardiac transplant-followed  in transplant clinic  Unable to tolerate hydralazine/isordil due to low bp  12/2017  Ef  5% on last echo  Planned follow up at transplant clinic-?  Rt heart cath this month  Medications Discontinued During This Encounter   Medication Reason    ergocalciferol (ERGOCALCIFEROL) 50,000 unit capsule Not A Current Medication    furosemide (LASIX) 40 mg tablet Not A Current Medication       No orders of the defined types were placed in this encounter. Follow-up Disposition:  Return in about 3 months (around 3/5/2018).

## 2017-12-05 NOTE — LETTER
Dixie Jenkins 1965 12/5/2017 Dear MD Elizabeth Palma MD Jannifer Lemme, MD Tandy Snooks, MD 
 
I had the pleasure of evaluating  Ms. Steele in office today. Below are the relevant portions of my assessment and plan of care. ICD-10-CM ICD-9-CM 1. Acute on chronic systolic heart failure (HCC) I50.23 428.23   
 stable 
ef 5% 
has significant weight loss 
edema improving 2. Cardiomyopathy, unspecified type (HCC) I42.9 425.4   
 ef 5% 3. Automatic implantable cardioverter-defibrillator in situ Z95.810 V45.02   
 stable 
normal function 4. V tach ([address] I47.2 427.1   
 had atp in past  
 
 
Current Outpatient Prescriptions Medication Sig Dispense Refill  bumetanide (BUMEX) 2 mg tablet Take 2 mg by mouth daily. 2 tablets in the morning, 1 tablet in the evening  potassium chloride (KLOR-CON) 20 mEq packet Take 20 mEq by mouth two (2) times a day.  cetirizine (ZYRTEC) 10 mg tablet Take  by mouth.  benzonatate (TESSALON) 100 mg capsule Take 100 mg by mouth three (3) times daily as needed for Cough.  albuterol (PROVENTIL HFA, VENTOLIN HFA, PROAIR HFA) 90 mcg/actuation inhaler Take 2 Puffs by inhalation as needed for Wheezing.  buPROPion SR (WELLBUTRIN SR) 150 mg SR tablet Take 150 mg by mouth daily.  allopurinol (ZYLOPRIM) 100 mg tablet Take  by mouth as needed.  triamcinolone acetonide (KENALOG) 0.1 % topical cream Apply  to affected area two (2) times a day. use thin layer  aspirin delayed-release 81 mg tablet Take  by mouth daily.  sitaGLIPtin (JANUVIA) 50 mg tablet Take 50 mg by mouth daily.  Alpha Lipoic Acid 600 mg cap Take 200 mg by mouth daily.  carvedilol (COREG) 12.5 mg tablet Take  by mouth. 1/2 morning, 6.25 evenings  digoxin (LANOXIN) 0.125 mg tablet Take  by mouth daily.  b complex vitamins (B COMPLEX 1) tablet Take 1 Tab by mouth daily.  multivitamin (ONE A DAY) tablet Take 1 Tab by mouth daily.  ferrous sulfate 325 mg (65 mg iron) tablet Take  by mouth Daily (before breakfast).  simvastatin (ZOCOR) 5 mg tablet Take  by mouth nightly.  fluticasone (FLONASE) 50 mcg/actuation nasal spray as needed.  NYSTATIN/TRIAMCIN (MYCOLOG II EX) by Apply Externally route.  calcium citrate-vitamin d3 (CITRACAL+D) 315-200 mg-unit Tab Take 2 Tabs by mouth daily (with breakfast). Calcium citrate po 1000 mg by mouth twice daily  lactic acid (LACTINOL) 10 % lotion Apply 12 % to affected area as needed. Orders Placed This Encounter  bumetanide (BUMEX) 2 mg tablet Sig: Take 2 mg by mouth daily. 2 tablets in the morning, 1 tablet in the evening  potassium chloride (KLOR-CON) 20 mEq packet Sig: Take 20 mEq by mouth two (2) times a day. If you have questions, please do not hesitate to call me. I look forward to following Ms. Steele along with you. Sincerely, Zina Cardozo MD

## 2022-03-19 PROBLEM — I47.20 V TACH (HCC): Status: ACTIVE | Noted: 2017-10-27
